# Patient Record
Sex: MALE | Race: WHITE
[De-identification: names, ages, dates, MRNs, and addresses within clinical notes are randomized per-mention and may not be internally consistent; named-entity substitution may affect disease eponyms.]

---

## 2017-01-16 ENCOUNTER — PRIOR ORIGINAL RECORDS (OUTPATIENT)
Dept: OTHER | Age: 82
End: 2017-01-16

## 2017-06-01 ENCOUNTER — PRIOR ORIGINAL RECORDS (OUTPATIENT)
Dept: OTHER | Age: 82
End: 2017-06-01

## 2017-06-16 ENCOUNTER — PRIOR ORIGINAL RECORDS (OUTPATIENT)
Dept: OTHER | Age: 82
End: 2017-06-16

## 2017-06-16 LAB
ALBUMIN: 3.6 G/DL
ALKALINE PHOSPHATATE(ALK PHOS): 63 IU/L
BILIRUBIN TOTAL: 0.6 MG/DL
BUN: 19 MG/DL
CALCIUM: 9.2 MG/DL
CHLORIDE: 108 MEQ/L
CHOLESTEROL, TOTAL: 137 MG/DL
CREATININE KINASE: 158 U/L
CREATININE, SERUM: 0.9 MG/DL
GGT: 40 IU/L
GLUCOSE: 91 MG/DL
HDL CHOLESTEROL: 50 MG/DL
HEMATOCRIT: 44.8 %
HEMOGLOBIN A1C: 5.9 %
HEMOGLOBIN: 15 G/DL
LDL CHOLESTEROL: 70 MG/DL
PLATELETS: 196 K/UL
POTASSIUM, SERUM: 4.9 MEQ/L
PROTEIN, TOTAL: 7.1 G/DL
RED BLOOD COUNT: 4.7 X 10-6/U
SGOT (AST): 34 IU/L
SGPT (ALT): 28 IU/L
SODIUM: 141 MEQ/L
THYROID STIMULATING HORMONE: 3.3 MLU/L
TRIGLYCERIDES: 86 MG/DL
VITAMIN D 25-OH: 29 NG/ML
WHITE BLOOD COUNT: 6.37 X 10-3/U

## 2017-12-01 ENCOUNTER — PRIOR ORIGINAL RECORDS (OUTPATIENT)
Dept: OTHER | Age: 82
End: 2017-12-01

## 2017-12-14 LAB
ALBUMIN: 3.8 G/DL
ALKALINE PHOSPHATATE(ALK PHOS): 77 IU/L
BILIRUBIN TOTAL: 0.6 MG/DL
BUN: 14 MG/DL
CALCIUM: 9.3 MG/DL
CHLORIDE: 104 MEQ/L
CHOLESTEROL, TOTAL: 135 MG/DL
CREATININE KINASE: 175 U/L
CREATININE, SERUM: 0.79 MG/DL
FREE T4: 0.98 MG/DL
GGT: 41 IU/L
GLUCOSE: 104 MG/DL
HDL CHOLESTEROL: 50 MG/DL
HEMATOCRIT: 42.8 %
HEMOGLOBIN: 14.5 G/DL
LDL CHOLESTEROL: 40 MG/DL
PLATELETS: 225 K/UL
POTASSIUM, SERUM: 4.2 MEQ/L
RED BLOOD COUNT: 4.57 X 10-6/U
SGOT (AST): 31 IU/L
SGPT (ALT): 28 IU/L
SODIUM: 139 MEQ/L
THYROID STIMULATING HORMONE: 4.43 MLU/L
TRIGLYCERIDES: 226 MG/DL
VITAMIN D 25-OH: 40 NG/ML
WHITE BLOOD COUNT: 8.32 X 10-3/U

## 2018-01-04 ENCOUNTER — HOSPITAL ENCOUNTER (OUTPATIENT)
Dept: CV DIAGNOSTICS | Age: 83
Discharge: HOME | End: 2018-01-04
Attending: INTERNAL MEDICINE
Payer: MEDICARE

## 2018-01-04 DIAGNOSIS — I25.10 CAD (CORONARY ARTERY DISEASE): ICD-10-CM

## 2018-01-04 PROCEDURE — 78452 HT MUSCLE IMAGE SPECT MULT: CPT | Performed by: INTERNAL MEDICINE

## 2018-01-04 PROCEDURE — 93018 CV STRESS TEST I&R ONLY: CPT | Performed by: INTERNAL MEDICINE

## 2018-01-04 PROCEDURE — 93017 CV STRESS TEST TRACING ONLY: CPT | Performed by: INTERNAL MEDICINE

## 2018-01-22 ENCOUNTER — PRIOR ORIGINAL RECORDS (OUTPATIENT)
Dept: OTHER | Age: 83
End: 2018-01-22

## 2018-06-19 ENCOUNTER — PRIOR ORIGINAL RECORDS (OUTPATIENT)
Dept: OTHER | Age: 83
End: 2018-06-19

## 2018-07-18 LAB
ALBUMIN: 3.7 G/DL
ALKALINE PHOSPHATATE(ALK PHOS): 67 IU/L
BILIRUBIN TOTAL: 0.7 MG/DL
BUN: 17 MG/DL
CALCIUM: 9 MG/DL
CHLORIDE: 105 MEQ/L
CHOLESTEROL, TOTAL: 132 MG/DL
CREATININE KINASE: 138 U/L
CREATININE, SERUM: 0.9 MG/DL
GGT: 40 IU/L
GLUCOSE: 86 MG/DL
HDL CHOLESTEROL: 49 MG/DL
LDL CHOLESTEROL: 63 MG/DL
POTASSIUM, SERUM: 4.2 MEQ/L
SGOT (AST): 38 IU/L
SGPT (ALT): 34 IU/L
SODIUM: 139 MEQ/L
THYROID STIMULATING HORMONE: 3.95 MLU/L
TRIGLYCERIDES: 100 MG/DL

## 2018-12-26 ENCOUNTER — PRIOR ORIGINAL RECORDS (OUTPATIENT)
Dept: OTHER | Age: 83
End: 2018-12-26

## 2019-01-01 ENCOUNTER — EXTERNAL RECORD (OUTPATIENT)
Dept: HEALTH INFORMATION MANAGEMENT | Facility: OTHER | Age: 84
End: 2019-01-01

## 2019-01-17 LAB
ALBUMIN: 3.8 G/DL
ALKALINE PHOSPHATATE(ALK PHOS): 76 IU/L
BILIRUBIN TOTAL: 0.7 MG/DL
BUN: 15 MG/DL
CALCIUM: 8.7 MG/DL
CHLORIDE: 106 MEQ/L
CHOLESTEROL, TOTAL: 148 MG/DL
CREATININE KINASE: 157 U/L
CREATININE, SERUM: 0.93 MG/DL
FREE T4: 0.91 MG/DL
GGT: 41 IU/L
GLUCOSE: 103 MG/DL
HDL CHOLESTEROL: 52 MG/DL
LDL CHOLESTEROL: 71 MG/DL
POTASSIUM, SERUM: 4 MEQ/L
SGOT (AST): 36 IU/L
SGPT (ALT): 30 IU/L
SODIUM: 139 MEQ/L
THYROID STIMULATING HORMONE: 4.35 MLU/L
TRIGLYCERIDES: 125 MG/DL
URIC ACID: 5.7 MG/DL

## 2019-01-21 ENCOUNTER — MYAURORA ACCOUNT LINK (OUTPATIENT)
Dept: OTHER | Age: 84
End: 2019-01-21

## 2019-01-21 ENCOUNTER — PRIOR ORIGINAL RECORDS (OUTPATIENT)
Dept: OTHER | Age: 84
End: 2019-01-21

## 2019-02-28 VITALS
SYSTOLIC BLOOD PRESSURE: 130 MMHG | HEART RATE: 72 BPM | WEIGHT: 173 LBS | BODY MASS INDEX: 24.22 KG/M2 | DIASTOLIC BLOOD PRESSURE: 74 MMHG | HEIGHT: 71 IN

## 2019-02-28 VITALS
BODY MASS INDEX: 25.2 KG/M2 | WEIGHT: 180 LBS | HEIGHT: 71 IN | SYSTOLIC BLOOD PRESSURE: 150 MMHG | HEART RATE: 68 BPM | DIASTOLIC BLOOD PRESSURE: 86 MMHG

## 2019-03-01 VITALS
HEART RATE: 60 BPM | HEIGHT: 71 IN | DIASTOLIC BLOOD PRESSURE: 76 MMHG | SYSTOLIC BLOOD PRESSURE: 130 MMHG | WEIGHT: 181 LBS | BODY MASS INDEX: 25.34 KG/M2

## 2019-04-22 RX ORDER — METOPROLOL TARTRATE 50 MG/1
TABLET, FILM COATED ORAL
COMMUNITY
End: 2020-01-20 | Stop reason: SDUPTHER

## 2019-04-22 RX ORDER — LEVOTHYROXINE SODIUM 0.05 MG/1
TABLET ORAL
COMMUNITY

## 2019-04-22 RX ORDER — SIMVASTATIN 80 MG
TABLET ORAL
COMMUNITY

## 2019-04-22 RX ORDER — AMOXICILLIN 500 MG
CAPSULE ORAL
COMMUNITY

## 2019-12-10 ENCOUNTER — HOSPITAL ENCOUNTER (OUTPATIENT)
Dept: CV DIAGNOSTICS | Facility: HOSPITAL | Age: 84
Discharge: HOME | End: 2019-12-10
Attending: INTERNAL MEDICINE
Payer: MEDICARE

## 2019-12-10 DIAGNOSIS — I25.10 CAD (CORONARY ARTERY DISEASE): ICD-10-CM

## 2019-12-10 PROCEDURE — 78452 HT MUSCLE IMAGE SPECT MULT: CPT | Performed by: INTERNAL MEDICINE

## 2019-12-10 PROCEDURE — 93018 CV STRESS TEST I&R ONLY: CPT | Performed by: INTERNAL MEDICINE

## 2019-12-10 PROCEDURE — 93017 CV STRESS TEST TRACING ONLY: CPT | Performed by: INTERNAL MEDICINE

## 2019-12-16 ENCOUNTER — TELEPHONE (OUTPATIENT)
Dept: CARDIOLOGY | Age: 84
End: 2019-12-16

## 2019-12-19 LAB
25(OH)D3+25(OH)D2 SERPL-MCNC: 25 NG/ML
ABSOLUTE IMMATURE GRANULOCYTES (OFFPRE24): NORMAL
ALBUMIN SERPL-MCNC: 4 G/DL
ALBUMIN/GLOB SERPL: NORMAL {RATIO}
ALP SERPL-CCNC: 75 U/L
ALT SERPL-CCNC: 35 U/L
ANION GAP SERPL CALC-SCNC: NORMAL MMOL/L
AST SERPL-CCNC: 36 U/L
BASO+EOS+MONOS # BLD: NORMAL 10*3/UL
BASO+EOS+MONOS NFR BLD: NORMAL %
BASOPHILS # BLD: NORMAL 10*3/UL
BASOPHILS NFR BLD: NORMAL %
BILIRUB SERPL-MCNC: 0.6 MG/DL
BUN SERPL-MCNC: 17 MG/DL
BUN/CREAT SERPL: NORMAL
CALCIUM SERPL-MCNC: 9.7 MG/DL
CHLORIDE SERPL-SCNC: 105 MMOL/L
CHOLEST SERPL-MCNC: 142 MG/DL
CHOLEST/HDLC SERPL: NORMAL {RATIO}
CK SERPL-CCNC: 154 U/L
CO2 SERPL-SCNC: 29 MMOL/L
CREAT SERPL-MCNC: 0.74 MG/DL
DIFFERENTIAL METHOD BLD: NORMAL
EOSINOPHIL # BLD: NORMAL 10*3/UL
EOSINOPHIL NFR BLD: NORMAL %
ERYTHROCYTE [DISTWIDTH] IN BLOOD: NORMAL %
GLOBULIN SER-MCNC: NORMAL G/DL
GLUCOSE SERPL-MCNC: 68 MG/DL
HCT VFR BLD CALC: 48.9 %
HDLC SERPL-MCNC: 59 MG/DL
HGB BLD-MCNC: 15.7 G/DL
IMMATURE GRANULOCYTES (OFFPRE25): NORMAL
LDLC SERPL CALC-MCNC: 64 MG/DL
LENGTH OF FAST TIME PATIENT: NORMAL H
LENGTH OF FAST TIME PATIENT: NORMAL H
LYMPHOCYTES # BLD: NORMAL 10*3/UL
LYMPHOCYTES NFR BLD: NORMAL %
MCH RBC QN AUTO: NORMAL PG
MCHC RBC AUTO-ENTMCNC: NORMAL G/DL
MCV RBC AUTO: NORMAL FL
MONOCYTES # BLD: NORMAL 10*3/UL
MONOCYTES NFR BLD: NORMAL %
MPV (OFFPRE2): NORMAL
NEUTROPHILS # BLD: NORMAL 10*3/UL
NEUTROPHILS NFR BLD: NORMAL %
NONHDLC SERPL-MCNC: NORMAL MG/DL
NRBC BLD MANUAL-RTO: NORMAL %
PLAT MORPH BLD: NORMAL
PLATELET # BLD: 206 10*3/UL
POTASSIUM SERPL-SCNC: 4 MMOL/L
PROT SERPL-MCNC: NORMAL G/DL
RBC # BLD: 5.07 10*6/UL
RBC MORPH BLD: NORMAL
SODIUM SERPL-SCNC: 137 MMOL/L
T4 FREE SERPL-MCNC: 0.96 NG/DL
TRIGL SERPL-MCNC: 95 MG/DL
TSH SERPL-ACNC: 5.59 M[IU]/L
VLDLC SERPL CALC-MCNC: NORMAL MG/DL
WBC # BLD: 6.79 10*3/UL
WBC MORPH BLD: NORMAL

## 2020-01-20 ENCOUNTER — OFFICE VISIT (OUTPATIENT)
Dept: CARDIOLOGY | Age: 85
End: 2020-01-20

## 2020-01-20 VITALS
HEART RATE: 68 BPM | HEIGHT: 71 IN | DIASTOLIC BLOOD PRESSURE: 72 MMHG | WEIGHT: 166 LBS | BODY MASS INDEX: 23.24 KG/M2 | SYSTOLIC BLOOD PRESSURE: 134 MMHG

## 2020-01-20 DIAGNOSIS — E78.00 PURE HYPERCHOLESTEROLEMIA: ICD-10-CM

## 2020-01-20 DIAGNOSIS — I10 HYPERTENSION, BENIGN: ICD-10-CM

## 2020-01-20 DIAGNOSIS — I25.10 CORONARY ARTERY DISEASE INVOLVING NATIVE HEART WITHOUT ANGINA PECTORIS, UNSPECIFIED VESSEL OR LESION TYPE: Primary | ICD-10-CM

## 2020-01-20 DIAGNOSIS — Z95.1 HX OF CABG: ICD-10-CM

## 2020-01-20 PROCEDURE — 99213 OFFICE O/P EST LOW 20 MIN: CPT | Performed by: NURSE PRACTITIONER

## 2020-01-20 RX ORDER — METOPROLOL TARTRATE 50 MG/1
50 TABLET, FILM COATED ORAL 2 TIMES DAILY
Qty: 180 TABLET | Refills: 3 | Status: SHIPPED
Start: 2020-01-20 | End: 2020-01-20 | Stop reason: DRUGHIGH

## 2020-01-20 RX ORDER — METOPROLOL TARTRATE 50 MG/1
50 TABLET, FILM COATED ORAL 2 TIMES DAILY
Qty: 180 TABLET | Refills: 3 | Status: SHIPPED
Start: 2020-01-20

## 2020-01-20 ASSESSMENT — ENCOUNTER SYMPTOMS
SUSPICIOUS LESIONS: 0
CHILLS: 0
HEMATOCHEZIA: 0
COUGH: 0
ALLERGIC/IMMUNOLOGIC COMMENTS: NO NEW FOOD ALLERGIES
WEIGHT GAIN: 0
BRUISES/BLEEDS EASILY: 0
HEMOPTYSIS: 0
FEVER: 0
WEIGHT LOSS: 0

## 2020-01-20 ASSESSMENT — PATIENT HEALTH QUESTIONNAIRE - PHQ9
SUM OF ALL RESPONSES TO PHQ9 QUESTIONS 1 AND 2: 0
2. FEELING DOWN, DEPRESSED OR HOPELESS: NOT AT ALL
1. LITTLE INTEREST OR PLEASURE IN DOING THINGS: NOT AT ALL
2. FEELING DOWN, DEPRESSED OR HOPELESS: NOT AT ALL
1. LITTLE INTEREST OR PLEASURE IN DOING THINGS: NOT AT ALL

## 2020-01-29 ENCOUNTER — DOCUMENTATION (OUTPATIENT)
Dept: CARDIOLOGY | Age: 85
End: 2020-01-29

## 2020-01-30 ENCOUNTER — CLINICAL ABSTRACT (OUTPATIENT)
Dept: CARDIOLOGY | Age: 85
End: 2020-01-30

## 2021-01-01 ENCOUNTER — EXTERNAL RECORD (OUTPATIENT)
Dept: HEALTH INFORMATION MANAGEMENT | Facility: OTHER | Age: 86
End: 2021-01-01

## 2021-01-25 ENCOUNTER — V-VISIT (OUTPATIENT)
Dept: CARDIOLOGY | Age: 86
End: 2021-01-25

## 2021-01-25 DIAGNOSIS — I45.0 RIGHT FASCICULAR BLOCK: ICD-10-CM

## 2021-01-25 DIAGNOSIS — E78.00 PURE HYPERCHOLESTEROLEMIA: ICD-10-CM

## 2021-01-25 DIAGNOSIS — I10 HYPERTENSION, BENIGN: ICD-10-CM

## 2021-01-25 DIAGNOSIS — I25.10 CORONARY ARTERY DISEASE INVOLVING NATIVE HEART WITHOUT ANGINA PECTORIS, UNSPECIFIED VESSEL OR LESION TYPE: Primary | ICD-10-CM

## 2021-01-25 DIAGNOSIS — Z95.1 HX OF CABG: ICD-10-CM

## 2021-01-25 PROCEDURE — 99442 TELEPHONE E&M BY PHYSICIAN EST PT NOT ORIG PREV 7 DAYS 11-20 MIN: CPT | Performed by: INTERNAL MEDICINE

## 2021-01-25 ASSESSMENT — PATIENT HEALTH QUESTIONNAIRE - PHQ9
1. LITTLE INTEREST OR PLEASURE IN DOING THINGS: NOT AT ALL
SUM OF ALL RESPONSES TO PHQ9 QUESTIONS 1 AND 2: 0
CLINICAL INTERPRETATION OF PHQ2 SCORE: NO FURTHER SCREENING NEEDED
CLINICAL INTERPRETATION OF PHQ9 SCORE: NO FURTHER SCREENING NEEDED
2. FEELING DOWN, DEPRESSED OR HOPELESS: NOT AT ALL
SUM OF ALL RESPONSES TO PHQ9 QUESTIONS 1 AND 2: 0

## 2021-01-25 ASSESSMENT — ENCOUNTER SYMPTOMS
FEVER: 0
HEMOPTYSIS: 0
HEMATOCHEZIA: 0
WEIGHT LOSS: 0
WEIGHT GAIN: 0
ALLERGIC/IMMUNOLOGIC COMMENTS: NO NEW FOOD ALLERGIES
COUGH: 0
CHILLS: 0
SUSPICIOUS LESIONS: 0
BRUISES/BLEEDS EASILY: 0

## 2021-01-28 ENCOUNTER — TELEPHONE (OUTPATIENT)
Dept: CARDIOLOGY | Age: 86
End: 2021-01-28

## 2023-06-12 ENCOUNTER — PATIENT MESSAGE (OUTPATIENT)
Dept: INTERNAL MEDICINE CLINIC | Facility: CLINIC | Age: 88
End: 2023-06-12

## 2023-06-12 NOTE — TELEPHONE ENCOUNTER
From: Mikal Coelho  To: Moris Burdick MD  Sent: 6/12/2023 12:24 PM CDT  Subject: HIIPA    Can you please tell me if I can upload my HIIPA/ POA forms to you?

## 2023-07-07 ENCOUNTER — TELEPHONE (OUTPATIENT)
Dept: INTERNAL MEDICINE CLINIC | Facility: CLINIC | Age: 88
End: 2023-07-07

## 2023-07-07 NOTE — TELEPHONE ENCOUNTER
Received medical records from [facility]   Patient has upcoming appt   Future Appointments   Date Time Provider Poly Edilia   7/14/2023 11:00 AM Ella Iglesias MD [facility] [place]
.

## 2023-07-14 ENCOUNTER — OFFICE VISIT (OUTPATIENT)
Dept: INTERNAL MEDICINE CLINIC | Facility: CLINIC | Age: 88
End: 2023-07-14
Payer: MEDICARE

## 2023-07-14 VITALS
BODY MASS INDEX: 23.48 KG/M2 | TEMPERATURE: 98 F | SYSTOLIC BLOOD PRESSURE: 100 MMHG | WEIGHT: 164 LBS | OXYGEN SATURATION: 97 % | DIASTOLIC BLOOD PRESSURE: 62 MMHG | HEART RATE: 104 BPM | HEIGHT: 70 IN | RESPIRATION RATE: 16 BRPM

## 2023-07-14 DIAGNOSIS — Z23 NEED FOR HEPATITIS B VACCINATION: ICD-10-CM

## 2023-07-14 DIAGNOSIS — M21.372 LEFT FOOT DROP: ICD-10-CM

## 2023-07-14 DIAGNOSIS — E03.9 HYPOTHYROIDISM, UNSPECIFIED TYPE: ICD-10-CM

## 2023-07-14 DIAGNOSIS — R60.0 LOWER EXTREMITY EDEMA: ICD-10-CM

## 2023-07-14 DIAGNOSIS — R41.3 MEMORY PROBLEM: ICD-10-CM

## 2023-07-14 DIAGNOSIS — E55.9 HYPOVITAMINOSIS D: ICD-10-CM

## 2023-07-14 DIAGNOSIS — I10 ESSENTIAL HYPERTENSION: ICD-10-CM

## 2023-07-14 DIAGNOSIS — R74.8 ELEVATED LIVER ENZYMES: ICD-10-CM

## 2023-07-14 DIAGNOSIS — H40.9 GLAUCOMA, UNSPECIFIED GLAUCOMA TYPE, UNSPECIFIED LATERALITY: ICD-10-CM

## 2023-07-14 DIAGNOSIS — I25.10 CORONARY ARTERY DISEASE INVOLVING NATIVE CORONARY ARTERY OF NATIVE HEART WITHOUT ANGINA PECTORIS: ICD-10-CM

## 2023-07-14 DIAGNOSIS — H91.90 HEARING LOSS, UNSPECIFIED HEARING LOSS TYPE, UNSPECIFIED LATERALITY: ICD-10-CM

## 2023-07-14 DIAGNOSIS — Z95.1 S/P CABG X 3: ICD-10-CM

## 2023-07-14 DIAGNOSIS — Z00.00 WELLNESS EXAMINATION: Primary | ICD-10-CM

## 2023-07-14 DIAGNOSIS — E78.00 PURE HYPERCHOLESTEROLEMIA: ICD-10-CM

## 2023-07-14 PROCEDURE — 99203 OFFICE O/P NEW LOW 30 MIN: CPT | Performed by: INTERNAL MEDICINE

## 2023-07-14 PROCEDURE — 1160F RVW MEDS BY RX/DR IN RCRD: CPT | Performed by: INTERNAL MEDICINE

## 2023-07-14 PROCEDURE — 3078F DIAST BP <80 MM HG: CPT | Performed by: INTERNAL MEDICINE

## 2023-07-14 PROCEDURE — 1170F FXNL STATUS ASSESSED: CPT | Performed by: INTERNAL MEDICINE

## 2023-07-14 PROCEDURE — 96160 PT-FOCUSED HLTH RISK ASSMT: CPT | Performed by: INTERNAL MEDICINE

## 2023-07-14 PROCEDURE — 90746 HEPB VACCINE 3 DOSE ADULT IM: CPT | Performed by: INTERNAL MEDICINE

## 2023-07-14 PROCEDURE — 1159F MED LIST DOCD IN RCRD: CPT | Performed by: INTERNAL MEDICINE

## 2023-07-14 PROCEDURE — G0010 ADMIN HEPATITIS B VACCINE: HCPCS | Performed by: INTERNAL MEDICINE

## 2023-07-14 PROCEDURE — 1126F AMNT PAIN NOTED NONE PRSNT: CPT | Performed by: INTERNAL MEDICINE

## 2023-07-14 PROCEDURE — 3074F SYST BP LT 130 MM HG: CPT | Performed by: INTERNAL MEDICINE

## 2023-07-14 PROCEDURE — G0439 PPPS, SUBSEQ VISIT: HCPCS | Performed by: INTERNAL MEDICINE

## 2023-07-14 PROCEDURE — 3008F BODY MASS INDEX DOCD: CPT | Performed by: INTERNAL MEDICINE

## 2023-07-14 RX ORDER — DORZOLAMIDE HYDROCHLORIDE AND TIMOLOL MALEATE 20; 5 MG/ML; MG/ML
SOLUTION/ DROPS OPHTHALMIC
COMMUNITY
Start: 2023-07-11 | End: 2023-07-14

## 2023-07-14 RX ORDER — AMOXICILLIN AND CLAVULANATE POTASSIUM 875; 125 MG/1; MG/1
1 TABLET, FILM COATED ORAL 2 TIMES DAILY
COMMUNITY
Start: 2023-07-11

## 2023-07-14 RX ORDER — CHOLECALCIFEROL (VITAMIN D3) 125 MCG
2000 CAPSULE ORAL DAILY
COMMUNITY
Start: 2023-02-08

## 2023-07-14 RX ORDER — AMOXICILLIN 500 MG
1 CAPSULE ORAL DAILY
COMMUNITY
End: 2023-07-14

## 2023-07-14 RX ORDER — BRIMONIDINE TARTRATE 1.5 MG/ML
1 SOLUTION/ DROPS OPHTHALMIC 2 TIMES DAILY
COMMUNITY
Start: 2023-06-16

## 2023-07-14 RX ORDER — LOSARTAN POTASSIUM 25 MG/1
12.5 TABLET ORAL DAILY
COMMUNITY
Start: 2023-06-23

## 2023-07-14 RX ORDER — DONEPEZIL HYDROCHLORIDE 10 MG/1
10 TABLET, FILM COATED ORAL NIGHTLY
COMMUNITY

## 2023-07-14 RX ORDER — FUROSEMIDE 40 MG/1
40 TABLET ORAL DAILY
COMMUNITY
Start: 2023-04-06

## 2023-07-14 NOTE — PATIENT INSTRUCTIONS
If diarrhea persists, it is likely related to Augmentin and please let the foot doctor know to change antibiotic.      Start daily protein supplement like [employer], ensure, boost, etc.     For leg swelling:  Limit salt   Use 3-4 pillows to elevate the legs  Continue compression socks   Walking can help      Follow up with hepatologist specialist   Dr. Joredn Siu [address] [address]  Phone: [phone]

## 2023-07-20 PROBLEM — E55.9 HYPOVITAMINOSIS D: Status: ACTIVE | Noted: 2023-07-20

## 2023-07-20 PROBLEM — E78.00 PURE HYPERCHOLESTEROLEMIA: Status: ACTIVE | Noted: 2023-07-20

## 2023-07-20 PROBLEM — M21.372 LEFT FOOT DROP: Status: ACTIVE | Noted: 2023-07-20

## 2023-07-20 PROBLEM — I10 ESSENTIAL HYPERTENSION: Status: ACTIVE | Noted: 2023-07-20

## 2023-07-20 PROBLEM — H40.9 GLAUCOMA: Status: ACTIVE | Noted: 2023-07-20

## 2023-07-20 PROBLEM — H91.90 HEARING LOSS: Status: ACTIVE | Noted: 2023-07-20

## 2023-07-20 PROBLEM — E03.9 HYPOTHYROIDISM: Status: ACTIVE | Noted: 2023-07-20

## 2023-07-20 PROBLEM — Z95.1 S/P CABG X 3: Status: ACTIVE | Noted: 2023-07-20

## 2023-07-20 PROBLEM — R41.3 MEMORY PROBLEM: Status: ACTIVE | Noted: 2023-07-20

## 2023-07-20 PROBLEM — R60.0 LOWER EXTREMITY EDEMA: Status: ACTIVE | Noted: 2023-07-20

## 2023-07-20 PROBLEM — I25.10 CORONARY ARTERY DISEASE INVOLVING NATIVE CORONARY ARTERY OF NATIVE HEART WITHOUT ANGINA PECTORIS: Status: ACTIVE | Noted: 2023-07-20

## 2023-07-20 PROBLEM — R74.8 ELEVATED LIVER ENZYMES: Status: ACTIVE | Noted: 2023-07-20

## 2023-10-04 ENCOUNTER — TELEPHONE (OUTPATIENT)
Dept: INTERNAL MEDICINE CLINIC | Facility: CLINIC | Age: 88
End: 2023-10-04

## 2023-10-04 ENCOUNTER — OFFICE VISIT (OUTPATIENT)
Dept: INTERNAL MEDICINE CLINIC | Facility: CLINIC | Age: 88
End: 2023-10-04
Payer: MEDICARE

## 2023-10-04 ENCOUNTER — LAB ENCOUNTER (OUTPATIENT)
Dept: LAB | Age: 88
End: 2023-10-04
Attending: INTERNAL MEDICINE
Payer: MEDICARE

## 2023-10-04 VITALS
BODY MASS INDEX: 20.93 KG/M2 | OXYGEN SATURATION: 98 % | RESPIRATION RATE: 16 BRPM | WEIGHT: 146.19 LBS | HEART RATE: 58 BPM | DIASTOLIC BLOOD PRESSURE: 60 MMHG | TEMPERATURE: 98 F | HEIGHT: 70 IN | SYSTOLIC BLOOD PRESSURE: 120 MMHG

## 2023-10-04 DIAGNOSIS — S91.109A OPEN WOUND OF TOE, INITIAL ENCOUNTER: ICD-10-CM

## 2023-10-04 DIAGNOSIS — Z23 NEED FOR INFLUENZA VACCINATION: ICD-10-CM

## 2023-10-04 DIAGNOSIS — E03.9 HYPOTHYROIDISM, UNSPECIFIED TYPE: ICD-10-CM

## 2023-10-04 DIAGNOSIS — E55.9 HYPOVITAMINOSIS D: ICD-10-CM

## 2023-10-04 DIAGNOSIS — I25.10 CORONARY ARTERY DISEASE INVOLVING NATIVE CORONARY ARTERY OF NATIVE HEART WITHOUT ANGINA PECTORIS: ICD-10-CM

## 2023-10-04 DIAGNOSIS — R41.3 MEMORY PROBLEM: ICD-10-CM

## 2023-10-04 DIAGNOSIS — E78.00 PURE HYPERCHOLESTEROLEMIA: ICD-10-CM

## 2023-10-04 DIAGNOSIS — I10 ESSENTIAL HYPERTENSION: Primary | ICD-10-CM

## 2023-10-04 DIAGNOSIS — Z23 NEED FOR HEPATITIS B VACCINATION: ICD-10-CM

## 2023-10-04 DIAGNOSIS — R74.8 ELEVATED LIVER ENZYMES: ICD-10-CM

## 2023-10-04 DIAGNOSIS — R63.4 WEIGHT LOSS: ICD-10-CM

## 2023-10-04 LAB
ALBUMIN SERPL-MCNC: 3.4 G/DL (ref 3.4–5)
ALBUMIN/GLOB SERPL: 1 {RATIO} (ref 1–2)
ALP LIVER SERPL-CCNC: 120 U/L
ALT SERPL-CCNC: 23 U/L
ANION GAP SERPL CALC-SCNC: 5 MMOL/L (ref 0–18)
AST SERPL-CCNC: 39 U/L (ref 15–37)
BASOPHILS # BLD AUTO: 0.04 X10(3) UL (ref 0–0.2)
BASOPHILS NFR BLD AUTO: 0.6 %
BILIRUB SERPL-MCNC: 1.2 MG/DL (ref 0.1–2)
BUN BLD-MCNC: 26 MG/DL (ref 7–18)
CALCIUM BLD-MCNC: 8.8 MG/DL (ref 8.5–10.1)
CHLORIDE SERPL-SCNC: 106 MMOL/L (ref 98–112)
CO2 SERPL-SCNC: 27 MMOL/L (ref 21–32)
CREAT BLD-MCNC: 1.12 MG/DL
EGFRCR SERPLBLD CKD-EPI 2021: 62 ML/MIN/1.73M2 (ref 60–?)
EOSINOPHIL # BLD AUTO: 0.21 X10(3) UL (ref 0–0.7)
EOSINOPHIL NFR BLD AUTO: 3 %
ERYTHROCYTE [DISTWIDTH] IN BLOOD BY AUTOMATED COUNT: 15.3 %
FASTING STATUS PATIENT QL REPORTED: YES
GLOBULIN PLAS-MCNC: 3.5 G/DL (ref 2.8–4.4)
GLUCOSE BLD-MCNC: 87 MG/DL (ref 70–99)
HCT VFR BLD AUTO: 45.2 %
HGB BLD-MCNC: 14.2 G/DL
IMM GRANULOCYTES # BLD AUTO: 0.02 X10(3) UL (ref 0–1)
IMM GRANULOCYTES NFR BLD: 0.3 %
LYMPHOCYTES # BLD AUTO: 1.45 X10(3) UL (ref 1–4)
LYMPHOCYTES NFR BLD AUTO: 20.8 %
MCH RBC QN AUTO: 30.7 PG (ref 26–34)
MCHC RBC AUTO-ENTMCNC: 31.4 G/DL (ref 31–37)
MCV RBC AUTO: 97.6 FL
MONOCYTES # BLD AUTO: 0.8 X10(3) UL (ref 0.1–1)
MONOCYTES NFR BLD AUTO: 11.5 %
NEUTROPHILS # BLD AUTO: 4.44 X10 (3) UL (ref 1.5–7.7)
NEUTROPHILS # BLD AUTO: 4.44 X10(3) UL (ref 1.5–7.7)
NEUTROPHILS NFR BLD AUTO: 63.8 %
OSMOLALITY SERPL CALC.SUM OF ELEC: 290 MOSM/KG (ref 275–295)
PLATELET # BLD AUTO: 151 10(3)UL (ref 150–450)
POTASSIUM SERPL-SCNC: 4 MMOL/L (ref 3.5–5.1)
PROT SERPL-MCNC: 6.9 G/DL (ref 6.4–8.2)
RBC # BLD AUTO: 4.63 X10(6)UL
SODIUM SERPL-SCNC: 138 MMOL/L (ref 136–145)
TSI SER-ACNC: 2.88 MIU/ML (ref 0.36–3.74)
VIT D+METAB SERPL-MCNC: 57.6 NG/ML (ref 30–100)
WBC # BLD AUTO: 7 X10(3) UL (ref 4–11)

## 2023-10-04 PROCEDURE — G0008 ADMIN INFLUENZA VIRUS VAC: HCPCS | Performed by: INTERNAL MEDICINE

## 2023-10-04 PROCEDURE — 1159F MED LIST DOCD IN RCRD: CPT | Performed by: INTERNAL MEDICINE

## 2023-10-04 PROCEDURE — G0010 ADMIN HEPATITIS B VACCINE: HCPCS | Performed by: INTERNAL MEDICINE

## 2023-10-04 PROCEDURE — 36415 COLL VENOUS BLD VENIPUNCTURE: CPT

## 2023-10-04 PROCEDURE — 3074F SYST BP LT 130 MM HG: CPT | Performed by: INTERNAL MEDICINE

## 2023-10-04 PROCEDURE — 99214 OFFICE O/P EST MOD 30 MIN: CPT | Performed by: INTERNAL MEDICINE

## 2023-10-04 PROCEDURE — 82306 VITAMIN D 25 HYDROXY: CPT

## 2023-10-04 PROCEDURE — 80053 COMPREHEN METABOLIC PANEL: CPT

## 2023-10-04 PROCEDURE — 85025 COMPLETE CBC W/AUTO DIFF WBC: CPT

## 2023-10-04 PROCEDURE — 1160F RVW MEDS BY RX/DR IN RCRD: CPT | Performed by: INTERNAL MEDICINE

## 2023-10-04 PROCEDURE — 84443 ASSAY THYROID STIM HORMONE: CPT

## 2023-10-04 PROCEDURE — 90746 HEPB VACCINE 3 DOSE ADULT IM: CPT | Performed by: INTERNAL MEDICINE

## 2023-10-04 PROCEDURE — 3008F BODY MASS INDEX DOCD: CPT | Performed by: INTERNAL MEDICINE

## 2023-10-04 PROCEDURE — 3078F DIAST BP <80 MM HG: CPT | Performed by: INTERNAL MEDICINE

## 2023-10-04 PROCEDURE — 90662 IIV NO PRSV INCREASED AG IM: CPT | Performed by: INTERNAL MEDICINE

## 2023-10-04 RX ORDER — FUROSEMIDE 40 MG/1
40 TABLET ORAL DAILY
Qty: 90 TABLET | Refills: 1 | Status: SHIPPED | OUTPATIENT
Start: 2023-10-04

## 2023-10-04 RX ORDER — METOPROLOL TARTRATE 50 MG/1
50 TABLET, FILM COATED ORAL 2 TIMES DAILY
Qty: 180 TABLET | Refills: 3 | Status: SHIPPED | OUTPATIENT
Start: 2023-10-04

## 2023-10-04 RX ORDER — DONEPEZIL HYDROCHLORIDE 10 MG/1
10 TABLET, FILM COATED ORAL NIGHTLY
Qty: 90 TABLET | Refills: 3 | Status: SHIPPED | OUTPATIENT
Start: 2023-10-04

## 2023-10-04 RX ORDER — LOSARTAN POTASSIUM 25 MG/1
12.5 TABLET ORAL DAILY
Qty: 90 TABLET | Refills: 3 | Status: SHIPPED | OUTPATIENT
Start: 2023-10-04

## 2023-10-04 NOTE — TELEPHONE ENCOUNTER
[phone] called Dr. Sandeep Darnell office and spoke to [facility]. Relayed 's message as stated below. [facility] will give this information to Dr. Sierra Kim.

## 2023-10-04 NOTE — TELEPHONE ENCOUNTER
Please call Dr. Madi Crawley office and let his nurse know that I'm concerned about his L 4th and 5th toe wounds and referred him to our wound care clinic for more intensive dressing changes etc. I encouraged him to follow up with Dr. Lisa Capellan as scheduled. Let Dr. Madi Crawley office know that the patient is having the MRI of the foot today at noon.      Lisa Capellan foot and ankle podiatrist  [phone]

## 2023-10-04 NOTE — PATIENT INSTRUCTIONS
Blood work today     Increase protein and calories   Eat 6 small meals a day  Protein - meat, fish, greek yogurt, cheese, milk, protein drinks  Change to whole milk     Vaseline or aquaphor for dryness of feet. Put on twice daily and then put sock over your feet.      Call to schedule appointment with wound care clinic

## 2023-10-05 ENCOUNTER — PATIENT MESSAGE (OUTPATIENT)
Dept: INTERNAL MEDICINE CLINIC | Facility: CLINIC | Age: 88
End: 2023-10-05

## 2023-10-05 DIAGNOSIS — M86.9 OSTEOMYELITIS OF LEFT FOOT, UNSPECIFIED TYPE (HCC): Primary | ICD-10-CM

## 2023-10-05 NOTE — TELEPHONE ENCOUNTER
From: Ilia Kovacs. To: Kenisha Kimble  Sent: 10/5/2023 7:49 AM CDT  Subject: MRI Results    Please find below the MRI results:  Tyrone Tian MD, [place] Resides: PERFORM, TRANSCRIBE, VERIFY, VERIFY  Event Display: Report  Authored Date: [id number]    Examination: MRI of the left foot without IV contrast    Clinical indication: Left foot pain, osteomyelitis    Comparison: None    Technique: MRI left foot was performed with sagittal T1 and STIR sequences and axial and coronal T2 fat-saturated and T1-weighted sequences. Findings: There is diffuse skin thickening and subcutaneous edema. There is diffuse muscle atrophy and denervation edema. The flexor and extensor tendons are intact. The abductor hallucis longus and abductor digiti minimi tendons are intact. There is abnormally decreased T1 and increased T2 signal intensity in the 4 proximal and middle phalanges, surrounding the fourth PIP joint. Bone marrow signal is otherwise normal. There is mild first MTP osteophytic spurring. There is a tarsometatarsal osteophytic spurring. Impression:  1. Fourth digit osteomyelitis with suspected fourth PIP septic arthritis. 2. Left foot cellulitis. 3. Diffuse muscle atrophy with denervation edema. 4. Tarsometatarsal and first MTP osteoarthrosis.

## 2023-10-06 NOTE — TELEPHONE ENCOUNTER
Left message with son, Sri Rodríguez, to check on status if Dr. Philomena Yuan has discussed results. Spoke with daughter, Bryant Mckeon. He has an appt with Dr. Philomena Yuan on Monday and started on clindamycin yesterday. He has follow up recommended with infectious disease specialist.  He has chronic wounds there and had improvement with some and then had a new wound. Recommend Dr. Lon Brown if they need another name.

## 2023-10-09 ENCOUNTER — PATIENT MESSAGE (OUTPATIENT)
Dept: INTERNAL MEDICINE CLINIC | Facility: CLINIC | Age: 88
End: 2023-10-09

## 2023-10-09 NOTE — TELEPHONE ENCOUNTER
From: Angelito Bacon. To: Rossy Ramey  Sent: 10/9/2023 10:11 AM CDT  Subject: High BUN and decreasing eGFR    Hi Dr. Zara Ceballos,  Thank you for all your care of my father, Ilana Olmedo. I have a question regarding his labs. His BUN has steadily increased from 16 in 2021 and now it is at 26. At the same time, his eGFR has decreased during this time from 99 to 62. One could argue that this is an indication of CKD. I recognize it may be due to his continued low water intake, but can you please let me know your thoughts on this point?   Thanks,  Rashmi Titus

## 2023-10-17 DIAGNOSIS — R41.3 MEMORY PROBLEM: ICD-10-CM

## 2023-10-17 RX ORDER — METOPROLOL TARTRATE 50 MG/1
50 TABLET, FILM COATED ORAL 2 TIMES DAILY
Qty: 180 TABLET | Refills: 3 | Status: SHIPPED | OUTPATIENT
Start: 2023-10-17

## 2023-10-17 RX ORDER — DONEPEZIL HYDROCHLORIDE 10 MG/1
10 TABLET, FILM COATED ORAL NIGHTLY
Qty: 90 TABLET | Refills: 3 | Status: SHIPPED | OUTPATIENT
Start: 2023-10-17

## 2023-10-17 RX ORDER — FUROSEMIDE 40 MG/1
40 TABLET ORAL DAILY
Qty: 90 TABLET | Refills: 1 | Status: SHIPPED | OUTPATIENT
Start: 2023-10-17

## 2023-10-17 NOTE — TELEPHONE ENCOUNTER
Pharmacy change for the following medications. Medication Detail    Medication Quantity Refills Start End   metoprolol tartrate 50 MG Oral Tab 180 tablet 3 10/4/2023    Sig:   Take 1 tablet (50 mg total) by mouth in the morning and 1 tablet (50 mg total) before bedtime. Route:   Oral     Order #:   [id number]         Medication Detail    Medication Quantity Refills Start End   donepezil 10 MG Oral Tab 90 tablet 3 10/4/2023    Sig:   Take 1 tablet (10 mg total) by mouth nightly. Route:   Oral     Order #:   [id number]         Medication Detail    Medication Quantity Refills Start End   furosemide 40 MG Oral Tab 90 tablet 1 10/4/2023    Sig:   Take 1 tablet (40 mg total) by mouth daily.      Route:   Oral     Order #:   [id number]       [address], [address] [facility] [phone], [phone]

## 2023-11-11 ENCOUNTER — PATIENT MESSAGE (OUTPATIENT)
Dept: INTERNAL MEDICINE CLINIC | Facility: CLINIC | Age: 88
End: 2023-11-11

## 2023-11-11 DIAGNOSIS — Z95.1 S/P CABG X 3: ICD-10-CM

## 2023-11-11 DIAGNOSIS — R53.1 GENERALIZED WEAKNESS: ICD-10-CM

## 2023-11-11 DIAGNOSIS — M21.372 LEFT FOOT DROP: ICD-10-CM

## 2023-11-11 DIAGNOSIS — H91.90 HEARING LOSS, UNSPECIFIED HEARING LOSS TYPE, UNSPECIFIED LATERALITY: ICD-10-CM

## 2023-11-11 DIAGNOSIS — I25.10 CORONARY ARTERY DISEASE INVOLVING NATIVE CORONARY ARTERY OF NATIVE HEART WITHOUT ANGINA PECTORIS: Primary | ICD-10-CM

## 2023-11-11 DIAGNOSIS — R60.0 LOWER EXTREMITY EDEMA: ICD-10-CM

## 2023-11-11 DIAGNOSIS — H40.9 GLAUCOMA, UNSPECIFIED GLAUCOMA TYPE, UNSPECIFIED LATERALITY: ICD-10-CM

## 2023-11-11 DIAGNOSIS — R41.3 MEMORY PROBLEM: ICD-10-CM

## 2023-11-13 NOTE — TELEPHONE ENCOUNTER
Left message for patient's son Aaliyah Smith to call back    Need more information about his condition. Did he seek care over the weekend in the ER? May need eval to rule out infectious cause etc if symptoms are a sudden change. I did place a referral to [facility] as requested.      Please call son, Aaliyah Smith, or daughter if unable to get ahold of Aaliyah Smith

## 2023-11-13 NOTE — TELEPHONE ENCOUNTER
Son Tubjeffery III returned call. Son states he believes the pt has just decided he has had enough. Pt is cognitively alert, he has just decided to stop eating and taking medications. Family believes pt's wishes should be honored due to age 80. Son does not believe there is an infectious cause to this change in the patient. Family contacted [address] [place] [address] Street and due to pt not eating 4-5 days, the insurance has already improved these services. CNA, RN, and admin staff came on Saturday 11/11/23. Hospital bed is supposed to be delivered tomorrow. Son is here from [place] through tomorrow. If son still has concerns about the hospital bed and possible falls, he will call us back tomorrow. Son said he has also contacted [employer] for a fall detection advice.

## 2023-11-13 NOTE — TELEPHONE ENCOUNTER
LS - pended referral for palliative care but unsure how to answer some of the questions. Unsure what is needed for the fall detection device, is that an order sent to the [facility]? Please advise, ty!